# Patient Record
Sex: FEMALE | Race: AMERICAN INDIAN OR ALASKA NATIVE
[De-identification: names, ages, dates, MRNs, and addresses within clinical notes are randomized per-mention and may not be internally consistent; named-entity substitution may affect disease eponyms.]

---

## 2022-07-01 ENCOUNTER — HOSPITAL ENCOUNTER (OUTPATIENT)
Dept: HOSPITAL 5 - OR | Age: 32
Discharge: HOME | End: 2022-07-01
Attending: SURGERY
Payer: COMMERCIAL

## 2022-07-01 VITALS — DIASTOLIC BLOOD PRESSURE: 70 MMHG | SYSTOLIC BLOOD PRESSURE: 108 MMHG

## 2022-07-01 DIAGNOSIS — Z79.899: ICD-10-CM

## 2022-07-01 DIAGNOSIS — D64.9: ICD-10-CM

## 2022-07-01 DIAGNOSIS — Z88.8: ICD-10-CM

## 2022-07-01 DIAGNOSIS — Z98.890: ICD-10-CM

## 2022-07-01 DIAGNOSIS — Z91.040: ICD-10-CM

## 2022-07-01 DIAGNOSIS — K43.6: Primary | ICD-10-CM

## 2022-07-01 LAB
HCT VFR BLD CALC: 34.9 % (ref 30.3–42.9)
HGB BLD-MCNC: 11.5 GM/DL (ref 10.1–14.3)
MCHC RBC AUTO-ENTMCNC: 33 % (ref 30–34)
MCV RBC AUTO: 86 FL (ref 79–97)
PLATELET # BLD: 228 K/MM3 (ref 140–440)
RBC # BLD AUTO: 4.07 M/MM3 (ref 3.65–5.03)

## 2022-07-01 PROCEDURE — 49653: CPT

## 2022-07-01 PROCEDURE — C1781 MESH (IMPLANTABLE): HCPCS

## 2022-07-01 PROCEDURE — 85027 COMPLETE CBC AUTOMATED: CPT

## 2022-07-01 PROCEDURE — 64450 NJX AA&/STRD OTHER PN/BRANCH: CPT

## 2022-07-01 PROCEDURE — 36415 COLL VENOUS BLD VENIPUNCTURE: CPT

## 2022-07-01 PROCEDURE — 81025 URINE PREGNANCY TEST: CPT

## 2022-07-01 NOTE — SHORT STAY SUMMARY
Short Stay Documentation


Date of service: 07/01/22





- History


Principal diagnosis: ventral hernia, incarcerated


H&P: obtained from office





- Allergies and Medications


Current Medications: 


                                    Allergies





ibuprofen Adverse Reaction (Severe, Verified 06/27/22 15:02)


   Swelling


   ITCHY THROAT 


naproxen [From Aleve] Adverse Reaction (Severe, Verified 06/27/22 15:02)


   Swelling


   itchy throat 


latex Adverse Reaction (Intermediate, Verified 06/27/22 15:02)


   Itching


CASHEWS Adverse Reaction (Severe, Uncoded 06/27/22 15:02)


   Swelling


   ITCHY THROAT 





                                Home Medications











 Medication  Instructions  Recorded  Confirmed  Last Taken  Type


 


Iron 1 cap PO DAILY 06/27/22 06/27/22 Unknown History


 


Vitamin D3 1 cap PO DAILY 06/27/22 06/27/22 Unknown History


 


Vitamin E 1 cap PO DAILY 06/27/22 06/27/22 Unknown History








Active Medications





Cefazolin Sodium (Cefazolin/Sterile Water 2 Gm/20 Ml Syringe)  2 gm IV PREOP NR


   Stop: 07/02/22 23:59


Hydromorphone HCl (Hydromorphone 0.5 Mg/0.5 Ml Inj)  0.25 mg IV Q10MIN PRN


   PRN Reason: Pain, Moderate (4-6)


Hydromorphone HCl (Hydromorphone 0.5 Mg/0.5 Ml Inj)  0.5 mg IV Q10MIN PRN


   PRN Reason: Pain , Severe (7-10)


Lactated Ringer's (Lactated Ringers)  1,000 mls @ 125 mls/hr IV AS DIRECT JO-ANN


   Last Admin: 07/01/22 11:05 Dose:  125 mls/hr


   


Midazolam HCl (Midazolam 2 Mg/2 Ml Inj)  2 mg IV PREOP NR


   Stop: 07/01/22 23:59


Ondansetron HCl (Ondansetron 4 Mg/2 Ml Inj)  4 mg IV ONCE PRN


   PRN Reason: Nausea And Vomiting











- Brief post op/procedure progress note


Date of procedure: 07/01/22


Pre-op diagnosis: ventral hernia, incarcerated


Post-op diagnosis: same


Procedure: 





robotic ventral hernia repair with mesh


Anesthesia: GETA, other (HEIDI block)


Findings: 





2 cm supraumbilical hernia with incarcerated preperitoneal fat. Repaired with 

11.4 cm bard ventralite composite mesh


Surgeon: SHADIA TALLEY


Assistant: DEEDEE ABEBE


Estimated blood loss: minimal


Pathology: none


Condition: stable





- Hospital course


Hospital course: 





Pt observed in PACU and discharged to home in stable condition





- Disposition


Condition at discharge: Good


Disposition: 01 HOME / SELF CARE / HOMELESS





Short Stay Discharge Plan


Activity: other (no heavy lifting of greater than 15 lbs for next 6 weeks)


Diet: regular


Wound: open to air, per your surgeon's advice


Additional Instructions: 


SEE PRINTED INSTRUCTIONS


Follow up with: 


JANIS ROWLAND PA [Primary Care Provider] - 7 Days


SHADIA TALLEY DO [Staff Physician] - 14 Days


Prescriptions: 


Gabapentin 300 mg PO BID #6 cap


HYDROcodone/APAP 5-325 [[place] 5/325] 1 each PO Q6HR PRN #20 tablet


 PRN Reason: Pain , Severe (7-10)

## 2022-07-01 NOTE — ANESTHESIA CONSULTATION
Anesthesia Consult and Med Hx


Date of service: 07/01/22





- Airway


Anesthetic Teeth Evaluation: Good


ROM Head & Neck: Adequate


Mental/Hyoid Distance: Adequate


Mallampati Class: Class II


Intubation Access Assessment: Good





- Pre-Operative Health Status


ASA Pre-Surgery Classification: ASA1


Proposed Anesthetic Plan: General


Nerve Block: TAP





- Pulmonary


Hx Smoking: No


Hx Sleep Apnea: No ([place] PRE SCREEN NEGATIVE)





- Cardiovascular System


Hx Hypertension: No





- Central Nervous System


Hx Psychiatric Problems: No





- Gastrointestinal


Hx Gastroesophageal Reflux Disease: No





- Hematic


Hx Anemia: Yes


Hx Sickle Cell Disease: No





- Other Systems


Hx Cancer: No

## 2022-07-06 NOTE — OPERATIVE REPORT
Operative Report


Operative Report: 





Date of procedure: 07/01/22


Pre-op diagnosis: ventral hernia, incarcerated


Post-op diagnosis: same


Procedure: 





robotic ventral hernia repair with mesh


Anesthesia: GETA, other (HEIDI block)


Findings: 





2 cm supraumbilical hernia with incarcerated preperitoneal fat. Repaired with 

11.4 cm bard ventralite composite mesh


Surgeon: SHADIA TALLEY


Assistant: DEEDEE ABEBE


Estimated blood loss: minimal


Pathology: none


Condition: stable


Hospital course: 





Pt observed in PACU and discharged to home in stable condition


- Disposition


Condition at discharge: Good


Disposition: 01 HOME / SELF CARE 





HPI and indication: 31-year-old female who presents to surgery clinic for 

evaluation of a bulge just above the umbilicus.  Physical exam revealed a reduc

ible supraumbilical ventral hernia.  The patient was having discomfort at the 

site and wanted to be evaluated for repair.  All risk, benefits, alternatives 

surgery discussed with patient questions answered.  It was recommended that the 

hernia be repaired robotically.  Alternatives such as open versus laparoscopic 

repair were also discussed.  The patient was in agreement and consent obtained. 







Procedure in detail: The patient was identified in the preoperative area and 

taken back to the operating room and placed on the operating room table in 

supine position.  After anesthesia was induced, both arms were tucked with all 

bony prominences padded appropriately.  The abdomen was then prepped and draped 

in usual sterile fashion and a timeout was performed.  The patient had a TAP 

block performed by anesthesia preoperatively.  A nick incision was made in the 

left upper quadrant at Bellamy's point through which a Veress needle was 

inserted.  The Veress needle position was confirmed using the saline drop test 

and the abdomen insufflated to 15 mmHg without incident.  A 5 mm incision was 

made in the right upper quadrant through which a 5 mm Optiview trocar was placed

under direct visualization.  The abdomen was inspected and there was no 

underlying injury to any of the abdominal structures.  The Veress needle was 

identified and removed.  A 12 mm balloon trocar was placed in the right lateral 

abdomen and an 8 mm robotic trocar in the right lower quadrant under direct 

visualization.  The 5 mm right upper quadrant trocar was replaced with an 8 mm 

robotic trocar under direct visualization.  The patient was tilted to the left 

and the robot docked.  A fenestrated bipolar was placed in arm #2 and a 

monopolar scissor in arm #1.  The surgeon was then transferred to the console.





The hernia was identified just superior to the umbilicus. I proceeded to create 

a preperitoneal flap to the right of the hernia defect.  The peritoneum was 

scored to the right of the hernia defect using a monopolar scissor and a 

preperitoneal plane developed in an avascular plane.  Using a combination of 

blunt dissection and cautery the plane superior to and inferior to the hernia 

was developed.  There was preperitoneal fat incarcerated in the hernia which was

carefully dissected and reduced.  I then carried my preperitoneal dissection to 

the left aspect of the hernia defect in order to accommodate mesh placement.  

The patient's peritoneum was thin and several peritoneal defects were created 

during the dissection.  Once the dissection was complete the pocket was checked 

for hemostasis.  The hernia defect was measured at 2 cm.  It was decided to fix 

the hernia with a 11.4 cm Bard ventralite composite mesh.  The mesh along with 

suture material placed into the abdomen by the assistant.  The pressure in the 

abdomen was turned down to 8 mmHg.  First the hernia defect was closed using a 

0- VLoc running stitch.  The mesh was then placed in the preperitoneal space and

centered.  The uncoated side was placed against the abdominal wall.  The mesh 

was sutured into place in all 4 quadrants using interrupted 2-0 Vicryl stitches.

 The mesh laid flat in the preperitoneal space with adequate overlap of the 

hernia. The peritoneum was approximated using 3 0 VLoc running stitch.  A defect

at the left upper corner of the mesh could not be closed and the mesh was 

sutured to the abdominal wall using a running 3-0 vloc stitch. The robot was 

then undocked and the surgeon scrubbed back in.  The remainder of the case was 

performed laparoscopically.





All sharp and suture material was removed under direct visualization.  The 12 mm

port was removed and the fascia closed with an interrupted 0-vicryl stitch using

the Freddie Crawford device..  The right lower quadrant port fascia was also 

closed with an interrupted 0 Vicryl stitch using the Freddie Crawford device.  

The right upper quadrant port was removed and the abdomen desufflated.  The skin

incisions were closed with 4-0 Monocryl subcuticular stitches and skin glue.  

Once the glue was dry a 4 x 4 gauze was balled up and placed at the hernia site 

and secured with a Tegaderm.





At the end of the case, all sponge, instrument, sharp counts were correct 2.  

An abdominal binder was applied to the patient.  The patient was awoken from 

anesthesia, extubated and taken to PACU in stable condition.